# Patient Record
(demographics unavailable — no encounter records)

---

## 2024-11-11 NOTE — REVIEW OF SYSTEMS
[Shortness Of Breath] : shortness of breath [Fever] : no fever [Chills] : no chills [Vision Problems] : no vision problems [Chest Pain] : no chest pain [Palpitations] : no palpitations [Lower Ext Edema] : no lower extremity edema [Abdominal Pain] : no abdominal pain [Nausea] : no nausea [Vomiting] : no vomiting [FreeTextEntry6] : 2/2 asthma

## 2024-11-11 NOTE — PHYSICAL EXAM
[No Acute Distress] : no acute distress [Well Nourished] : well nourished [Well Developed] : well developed [Well-Appearing] : well-appearing [Normal Sclera/Conjunctiva] : normal sclera/conjunctiva [PERRL] : pupils equal round and reactive to light [Normal Outer Ear/Nose] : the outer ears and nose were normal in appearance [No Respiratory Distress] : no respiratory distress  [No Accessory Muscle Use] : no accessory muscle use [Clear to Auscultation] : lungs were clear to auscultation bilaterally [Normal Rate] : normal rate  [Regular Rhythm] : with a regular rhythm [Normal S1, S2] : normal S1 and S2 [No Edema] : there was no peripheral edema [Soft] : abdomen soft [Non-distended] : non-distended [Non Tender] : non-tender [de-identified] : Clear TM in L ear. White speckles present in R TM. B/l ear canals clear although w/ mild erythema likely from frequent cleaning w/ qtips.

## 2024-11-11 NOTE — HEALTH RISK ASSESSMENT
[0] : 2) Feeling down, depressed, or hopeless: Not at all (0) [PHQ-9 Negative - No further assessment needed] : PHQ-9 Negative - No further assessment needed [Never] : Never [[id number]] : 0

## 2024-11-11 NOTE — HEALTH RISK ASSESSMENT
[0] : 2) Feeling down, depressed, or hopeless: Not at all (0) [PHQ-9 Negative - No further assessment needed] : PHQ-9 Negative - No further assessment needed [Never] : Never [[id number]] : 0

## 2024-11-11 NOTE — PLAN
[FreeTextEntry1] : Dc'ed metoprolol succinate and started on labetalol 200mg bid for resistant HTN. F/u in 2 wks for adjustment as needed.  Case discussed w/ Dr. Feng

## 2024-11-11 NOTE — HISTORY OF PRESENT ILLNESS
[FreeTextEntry1] : Pt states he is here for a follow up.  [de-identified] : 64M w/ PMH of HTN, asthma, BPH, GERD, presenting for follow up today of otitis externa and HTN.  Pt has received Ciprofloxacin 0.3% and dexamethasone 0.1% otic suspension x 7 days on 10/3/24 and had relief after using it; however, itching is still present today and would like to know if he could get another refill of it if possible.   In terms of his resistant blood pressure, pt has not seen a cardiologist as referred as he did not have time to make an appointment due to his wife's appointments and due to the office only being open for 2 days for making appointments. Pt currently taking Olmesartan-Amlodipine-HCTZ, 40-10-25mg qd, Metoprolol Succinate 100mg qd, and furosemide 40mg qd. His BP is elevated today as he did not take his medications this morning and was rushing to the appointment. When pt measures BP at home, normally at 190-210s/120-140s. Denies HA, dizziness, vision changes, N/V.

## 2024-11-11 NOTE — HISTORY OF PRESENT ILLNESS
[FreeTextEntry1] : Pt states he is here for a follow up.  [de-identified] : 64M w/ PMH of HTN, asthma, BPH, GERD, presenting for follow up today of otitis externa and HTN.  Pt has received Ciprofloxacin 0.3% and dexamethasone 0.1% otic suspension x 7 days on 10/3/24 and had relief after using it; however, itching is still present today and would like to know if he could get another refill of it if possible.   In terms of his resistant blood pressure, pt has not seen a cardiologist as referred as he did not have time to make an appointment due to his wife's appointments and due to the office only being open for 2 days for making appointments. Pt currently taking Olmesartan-Amlodipine-HCTZ, 40-10-25mg qd, Metoprolol Succinate 100mg qd, and furosemide 40mg qd. His BP is elevated today as he did not take his medications this morning and was rushing to the appointment. When pt measures BP at home, normally at 190-210s/120-140s. Denies HA, dizziness, vision changes, N/V.

## 2024-11-11 NOTE — PHYSICAL EXAM
[No Acute Distress] : no acute distress [Well Nourished] : well nourished [Well Developed] : well developed [Well-Appearing] : well-appearing [Normal Sclera/Conjunctiva] : normal sclera/conjunctiva [PERRL] : pupils equal round and reactive to light [Normal Outer Ear/Nose] : the outer ears and nose were normal in appearance [No Respiratory Distress] : no respiratory distress  [No Accessory Muscle Use] : no accessory muscle use [Clear to Auscultation] : lungs were clear to auscultation bilaterally [Normal Rate] : normal rate  [Regular Rhythm] : with a regular rhythm [Normal S1, S2] : normal S1 and S2 [No Edema] : there was no peripheral edema [Soft] : abdomen soft [Non Tender] : non-tender [Non-distended] : non-distended [de-identified] : Clear TM in L ear. White speckles present in R TM. B/l ear canals clear although w/ mild erythema likely from frequent cleaning w/ qtips.

## 2024-11-25 NOTE — HISTORY OF PRESENT ILLNESS
[Home] : at home, [unfilled] , at the time of the visit. [Medical Office: ([facility])___] : at the medical office located in  [Verbal consent obtained from patient] : the patient, [unfilled] [FreeTextEntry1] : htn urgency [de-identified] : 64M PMHx resistant HTN, asthma, BPH, and GERD, presenting for follow up of HTN. He reports regularly measuring BP at home, with systolic BPs frequently over 200 (today measured twice, sbp 210, reports regular medication adherence). Denies headache, nausea, vomiting, dizziness, blurry vision, or worsening fatigue, or any other acute sx or complaints at this time.

## 2024-11-25 NOTE — HISTORY OF PRESENT ILLNESS
[Home] : at home, [unfilled] , at the time of the visit. [Medical Office: ([facility])___] : at the medical office located in  [Verbal consent obtained from patient] : the patient, [unfilled] [FreeTextEntry1] : htn urgency [de-identified] : 64M PMHx resistant HTN, asthma, BPH, and GERD, presenting for follow up of HTN. He reports regularly measuring BP at home, with systolic BPs frequently over 200 (today measured twice, sbp 210, reports regular medication adherence). Denies headache, nausea, vomiting, dizziness, blurry vision, or worsening fatigue, or any other acute sx or complaints at this time.

## 2024-11-25 NOTE — END OF VISIT
[] : Resident [FreeTextEntry3] : TTM visit  #HTN urgency- was seen few weeks ago. bp still very high . needs to come to office this week to evaluate. needs to treat PRICILLA. labetalol added few weeks ago.  can consider adding aldactone for resistant htn  #elevated PSA_ needs to reassess later [Time Spent: ___ minutes] : I have spent [unfilled] minutes of time on the encounter which excludes teaching and separately reported services.

## 2024-11-25 NOTE — PLAN
[FreeTextEntry1] : 64M PMHx resistant HTN, asthma, BPH, and GERD, presenting for follow up of HTN.  #resistant hypertension pt endorses taking amlodipine-valsartan-hctz combination pill and losartan (recently added to current regimen), nonetheless w persistently elevated bp. unclear whether cuff is correct size (large) - follow up in person this week, pt in agreement - rx for 10d spironolactone sent to preferred pharmacy, will continue pending follow up           - risk for hyperkalemia given other medications, check at follow up, will require regular monitoring - collect labs at upcoming appt, ensure home cuff size correct  #PRICILLA moderate pricilla per recent sleep study, likely contributing to resistant htn - follow up referral to sleep medicine, pt would benefit from use of cpap ovn

## 2025-01-17 NOTE — REVIEW OF SYSTEMS
[Back Pain] : back pain [Fever] : no fever [Chills] : no chills [Hearing Loss] : no hearing loss [Nasal Discharge] : no nasal discharge [Sore Throat] : no sore throat [Chest Pain] : no chest pain [Palpitations] : no palpitations [Lower Ext Edema] : no lower extremity edema [Shortness Of Breath] : no shortness of breath [Wheezing] : no wheezing [Cough] : no cough [Abdominal Pain] : no abdominal pain [Nausea] : no nausea [Constipation] : no constipation [Vomiting] : no vomiting [Dysuria] : no dysuria [Hematuria] : no hematuria [Joint Pain] : no joint pain [Headache] : no headache [Dizziness] : no dizziness [Fainting] : no fainting [FreeTextEntry4] : bilateral ear itching

## 2025-01-17 NOTE — HEALTH RISK ASSESSMENT
[Little interest or pleasure doing things] : 1) Little interest or pleasure doing things [Feeling down, depressed, or hopeless] : 2) Feeling down, depressed, or hopeless [0] : 2) Feeling down, depressed, or hopeless: Not at all (0) [PHQ-9 Negative - No further assessment needed] : PHQ-9 Negative - No further assessment needed [Never] : Never [[id number]] : 0

## 2025-01-17 NOTE — END OF VISIT
[] : Resident [FreeTextEntry3] : 64M w/uncontrolled HTN here for medication refills.  BP elevated today, asymptomatic. Has not taken any of his medications while he was taking care of his mother for several days.  Refill meds, f/u Monday for BP check.  Has f/u with Dr. Marisol Landrum

## 2025-01-17 NOTE — PHYSICAL EXAM
[No Acute Distress] : no acute distress [Well Nourished] : well nourished [Well Developed] : well developed [Normal Sclera/Conjunctiva] : normal sclera/conjunctiva [PERRL] : pupils equal round and reactive to light [Normal Outer Ear/Nose] : the outer ears and nose were normal in appearance [No JVD] : no jugular venous distention [No Lymphadenopathy] : no lymphadenopathy [No Respiratory Distress] : no respiratory distress  [No Accessory Muscle Use] : no accessory muscle use [Clear to Auscultation] : lungs were clear to auscultation bilaterally [Normal Rate] : normal rate  [Regular Rhythm] : with a regular rhythm [Normal S1, S2] : normal S1 and S2 [Soft] : abdomen soft [Non Tender] : non-tender [Non-distended] : non-distended [Normal Bowel Sounds] : normal bowel sounds [No CVA Tenderness] : no CVA  tenderness [No Spinal Tenderness] : no spinal tenderness [Coordination Grossly Intact] : coordination grossly intact [No Focal Deficits] : no focal deficits [Normal Affect] : the affect was normal [Normal Insight/Judgement] : insight and judgment were intact [de-identified] : Bilateral hazy TMs (R>L)

## 2025-01-17 NOTE — ASSESSMENT
[FreeTextEntry1] : 64-year-old male with PMHx resistant HTN, asthma, BPH, and GERD presented to the clinic for HTN follow-up. Patient reports he has not been taking his anti-hypertensives for the past 3 days due to unexpectedly needing to take care of his ill mother. Refilled labetolol 200mg BID & omelsartan-amlodipind-HCTZ 40-10-25mg QD. Patient has follow-up appointment with cardiologist in February 2025. RTC in 1 week to recheck BP.

## 2025-01-17 NOTE — HISTORY OF PRESENT ILLNESS
[FreeTextEntry1] : pt here to recheck bp [de-identified] : David is a 64-year-old male with PMHx resistant HTN, asthma, BPH, and GERD presented to the clinic for HTN follow-up. Patient reports he has not been taking his anti-hypertensives for the past 3 days due to unexpectedly needing to take care of his ill mother. He regularly measures his blood pressure at home and states it is in the 200s/100s, will improve to the 190s/90s at best with medications. He also reports bilateral ear itching despite two courses of oral antibiotics and ear drops. Denies headache, changes to vision, lightheadedness, chest pain, shortness of breath, nausea, vomiting or abdominal pain. Denies changes to urinary or bowel habits.

## 2025-03-07 NOTE — HISTORY OF PRESENT ILLNESS
[Never] : never [TextBox_4] : My patient since 2019 w lifelong asthma w frequent exacerbations, never smoker, trouble adhering to inhaler regimen. Sinus/nasal disease and allergies, reflux. Prefers Breo, didn't find Trelegy better when tried previously. Sees ENT and GI here.  8/15/2024: Was changed to generic Advair for insurance reasons. Prefer Breo. In general takes it once a day, but not today. Taking albuterol many times a day. 4-5x a day with relief. He remains on carvedilol. His stepfather . Missed follow up w Dr Santana for this reason. Sees allergist, Dr Mendoza Bourgeois, has allergies including cat, does not have a cat but his kids do and he reacts to cat hair on their clothing when they visit.   3/7/2025: Was taken off carvedilol, now on metoprolol. He does think his breathing was better once she stopped carvedilol. He remains on Breo once a day, including today. Still uses albuterol multiple times a day. Has not seen his allergist. We had planned to start Dupixent and did paperwork but he did not follow up. His stated reason for this was that his stepfather  but of course this had already happened prior to making this plan to start Dupixent. He reports also caring for wife and mother who have illness. He states he has not really thought about Dupixent. Primary care sent him for sleep study in the interim.

## 2025-03-07 NOTE — ASSESSMENT
[FreeTextEntry1] : Data reviewed:  PSG 10/2024 [facility]: mod PRICILLA, AHI 25/18, ellen 78%, 19 min <89%  Labs 8/2019: eos 500/ul, IgE 94, RAST cat, neg Aspergillus Abs Eos 500/ul again in 2020 and most recently 370/ul (5/18/2024)  CTA chest [facility] 5/10/2019 : mild apical scarring but otherwise unremarkable lung parenchyma CXR [facility] 5/2024: clear lungs Cardiac CT [facility] 5/2024 : no lung findings  [place] 08/08/2019 : restricted, FEV1 74% / FENO 19 [place] 2/13/20: mild obstruction, best FEV1 81% [place] 3/3/2023: restricted, FEV1 64% / FENO 7 P[place] 4/11/2024: FVC 2.82L (79%), FEV1 1.99L (74%), no BDR, TLC 4.22L (73%), DLCO 18.23 (73%) [place] 8/15/2024: restricted, FEV1 67% / FENO 11  Impression: Asthma by history  - Exacerbation 1/2024 Sinus disease Reflux Moderate PRICILLA - declines CPAP today (3/7/2025) due to prior use with no perceived benefit  Plan: Clinically he has asthma. He can stay on Breo daily and it is a good change that he is off carvedilol. We had planned to start Dupixent but the patient did not follow up on his end. We will table this until/unless he wants to pursue it. He has moderate PRICILLA. He's used CPAP before and didn't perceive benefit from treatment in terms of sleep quality. Discussed why this might be and how we typically deal w this. Discussed risks of untreated PRICILLA. He declines tx. He can let me know if he changes his mind. Will plan on routine follow up in 6 mos.

## 2025-03-20 NOTE — END OF VISIT
[] : Resident [FreeTextEntry3] : Patient is here for follow-up of hypertension and also for a rash.  Patient is on multiple medications for blood pressure and who has seen a cardiologist recently who advised titrating up his beta-blockade if his blood pressure is not controlled.  His blood pressure is not controlled today so we will increase his metoprolol succinate to 200 mg daily. He is also here for a preop. His BP must be <160 systolic prior to surgery. F/U soon Patient has a history of atopy and the rash appears eczematous.  Will prescribe hydrocortisone cream.  If no improvement will refer to dermatology.

## 2025-03-20 NOTE — PHYSICAL EXAM
[No Acute Distress] : no acute distress [Well Nourished] : well nourished [Well Developed] : well developed [Normal Sclera/Conjunctiva] : normal sclera/conjunctiva [PERRL] : pupils equal round and reactive to light [No JVD] : no jugular venous distention [Supple] : supple [No Respiratory Distress] : no respiratory distress  [No Accessory Muscle Use] : no accessory muscle use [Clear to Auscultation] : lungs were clear to auscultation bilaterally [Normal Rate] : normal rate  [Regular Rhythm] : with a regular rhythm [Normal S1, S2] : normal S1 and S2 [Soft] : abdomen soft [Non Tender] : non-tender [Normal Bowel Sounds] : normal bowel sounds [No CVA Tenderness] : no CVA  tenderness [No Spinal Tenderness] : no spinal tenderness [de-identified] : dry & scaly plaque left upper arm with skin discoloration & excoriations, small patch of skin discoloration to right lower neck

## 2025-03-20 NOTE — ASSESSMENT
[FreeTextEntry1] : 64-year-old male with PMHx resistant HTN, asthma, BPH, and GERD presented to the clinic for HTN follow-up. Patient also reports a pruritic rash that began 1 month ago behind his ear and is now more diffuse. Increased metoprolol from 100mg to 200mg as per cardiology note - patient will be seeing Dr. Damon on 3/21/2024. Dermatology referral and hydrocortisone 2% ointment given for atopic dermatitis. Of note, patient has a cystoscopy scheduled for 4/21/2025, EKG & bloodwork done today. RTC in 3 weeks.  Discussed with Dr. Vidal.

## 2025-03-20 NOTE — PHYSICAL EXAM
[de-identified] : N/A - TELEPHONE ENCOUNTER
[de-identified] : N/A - TELEPHONE ENCOUNTER
No deformities present

## 2025-03-20 NOTE — HISTORY OF PRESENT ILLNESS
[FreeTextEntry1] : BP follow-up [de-identified] : David is a 64-year-old male with PMHx resistant HTN, asthma, BPH, and GERD presented to the clinic for HTN follow-up. Patient also reports a pruritic rash that began 1 month ago behind his ear and is now more diffuse - located on his lower neck and upper left arm. He saw Dr. Damon (cardiologist) on 2/24/2025 who stopped labetalol and started metoprolol 100mg in addition to amlodipine-olmesartan-HCTZ at 10-40-25 mg daily and continue furosemide 40 mg daily. Patient states he has been taking medications as prescribed and reports home BPs remain 180s+/90s+. Denies fever/chills, shortness of breath, chest pain, palpitations, nausea, vomiting or abdominal pain. Denies changes in urinary or bowel habits.

## 2025-03-20 NOTE — HISTORY OF PRESENT ILLNESS
[FreeTextEntry1] : BP follow-up [de-identified] : David is a 64-year-old male with PMHx resistant HTN, asthma, BPH, and GERD presented to the clinic for HTN follow-up. Patient also reports a pruritic rash that began 1 month ago behind his ear and is now more diffuse - located on his lower neck and upper left arm. He saw Dr. Damon (cardiologist) on 2/24/2025 who stopped labetalol and started metoprolol 100mg in addition to amlodipine-olmesartan-HCTZ at 10-40-25 mg daily and continue furosemide 40 mg daily. Patient states he has been taking medications as prescribed and reports home BPs remain 180s+/90s+. Denies fever/chills, shortness of breath, chest pain, palpitations, nausea, vomiting or abdominal pain. Denies changes in urinary or bowel habits.

## 2025-03-20 NOTE — REVIEW OF SYSTEMS
[Itching] : itching [Skin Rash] : skin rash [Fever] : no fever [Chills] : no chills [Chest Pain] : no chest pain [Palpitations] : no palpitations [Shortness Of Breath] : no shortness of breath [Cough] : no cough [Abdominal Pain] : no abdominal pain [Nausea] : no nausea [Diarrhea] : no diarrhea [Vomiting] : no vomiting [Dysuria] : no dysuria [Back Pain] : no back pain

## 2025-03-20 NOTE — PHYSICAL EXAM
[No Acute Distress] : no acute distress [Well Nourished] : well nourished [Well Developed] : well developed [Normal Sclera/Conjunctiva] : normal sclera/conjunctiva [PERRL] : pupils equal round and reactive to light [No JVD] : no jugular venous distention [Supple] : supple [No Respiratory Distress] : no respiratory distress  [No Accessory Muscle Use] : no accessory muscle use [Clear to Auscultation] : lungs were clear to auscultation bilaterally [Normal Rate] : normal rate  [Regular Rhythm] : with a regular rhythm [Normal S1, S2] : normal S1 and S2 [Soft] : abdomen soft [Non Tender] : non-tender [Normal Bowel Sounds] : normal bowel sounds [No CVA Tenderness] : no CVA  tenderness [No Spinal Tenderness] : no spinal tenderness [de-identified] : dry & scaly plaque left upper arm with skin discoloration & excoriations, small patch of skin discoloration to right lower neck

## 2025-04-18 NOTE — ASSESSMENT
[FreeTextEntry1] : 65M with PMHx resistant HTN c/b hypertensive retinopathy, asthma, BPH, and GERD presented to the clinic for HTN follow-up.   Precepted with Dr. Causey.

## 2025-04-18 NOTE — REVIEW OF SYSTEMS
[Itching] : itching [Negative] : Psychiatric [FreeTextEntry6] : intermittent GARCIA [de-identified] : pruritic rash R face

## 2025-04-18 NOTE — HISTORY OF PRESENT ILLNESS
[FreeTextEntry1] : pt is here for follow up. [de-identified] : 65M with PMHx resistant HTN c/b hypertensive retinopathy, asthma, BPH, and GERD presented to the clinic for HTN follow-up. Pt recently increased metoprolol succinate from 100mg qd to 200mg and has been feeling that chest is tighter, although tolerable. Takes BPs at home and states they are regularly 180-200s systolic over 100 diastolic. Currently asymptomatic with no complaints - denies HA, blurry vision, chest pain, palpitations, dizziness, lightheadedness. When asked, pt states he has had renal US in the past but that it has been a long time. Does not see a nephrologist. Pt had pre-op for cystoscopy next week but is unable to undergo procedure because of continued hypertension. Still c/o pruritic rash R side of face, states that hydrocortisone cream did not work.

## 2025-04-18 NOTE — END OF VISIT
[] : Resident [FreeTextEntry3] : 65M w/uncontrolled HTN, asthma, atopic dermatitis here for f/u. Blood pressures in clinic, at home, and at other providers office are always elevated above goal.  Is on max dose of ARB, CCB, diuretic, bb and started on Lasix 40 by Dr. Damon. Will check ACR, renal US to r/o ALTAGRACIA (pt does report neg prior renal US). Tasked DR. Damon to discuss and see if reasonable to start hydralazine?  Short term f/u next week.

## 2025-04-18 NOTE — HEALTH RISK ASSESSMENT
[0] : 2) Feeling down, depressed, or hopeless: Not at all (0) [Never] : Never [PHQ-2 Negative - No further assessment needed] : PHQ-2 Negative - No further assessment needed [Time Spent: ___ Minutes] : I spent [unfilled] minutes performing a depression screening for this patient. [[id number]] : 0

## 2025-04-18 NOTE — PHYSICAL EXAM
[Normal] : affect was normal and insight and judgment were intact [de-identified] : large neck size [de-identified] : erythematous macules R side of face with larger macules towards forehead

## 2025-05-27 NOTE — ASSESSMENT
[FreeTextEntry1] : #Uncontrolled hypertension -> Patient blood pressure has been uncontrolled for a long time.  In terms with his renal function is grossly normal but he does have hypertensive retinopathy.  His serum potassium and total serum bicarbonate are normal.  He has had multiple CT angio done in the past that did not show renal artery stenosis.  I will screen for secondary hypertensive disorders.  I will get renal duplex, obtain renin aldosterone ratio, check plasma metanephrine levels and random cortisol. -> Patient just had his regimen of BP changed this week therefore I will make no further changes at this time. -> I will get a 24-hour a.m. BP on him as well. -> My plan would be to maximize MRA and if no significant improvement in blood pressure then consider either clonidine or hydralazine.  Follow-up in 4 weeks

## 2025-05-27 NOTE — REVIEW OF SYSTEMS
[Fever] : no fever [Chills] : no chills [Shortness Of Breath] : no shortness of breath [Wheezing] : no wheezing [Cough] : no cough [SOB on Exertion] : no shortness of breath during exertion [Abdominal Pain] : no abdominal pain [Vomiting] : no vomiting [Constipation] : no constipation [Diarrhea] : no diarrhea [Dysuria] : no dysuria [Incontinence] : no incontinence [Hesitancy] : no urinary hesitancy [Nocturia] : no nocturia [Joint Swelling] : no joint swelling [Joint Stiffness] : no joint stiffness

## 2025-05-27 NOTE — HISTORY OF PRESENT ILLNESS
[FreeTextEntry1] : NPV-Rash  [de-identified] :  y/o M/F presents for Rash -located on neck, face, chest and arms onset few months -has tried benadryl but did not help -very itchy not painful

## 2025-05-27 NOTE — HISTORY OF PRESENT ILLNESS
[FreeTextEntry1] : Reason for visit: Hypertension  65-year-old male with uncontrolled hypertension here today for establishing care.  Patient has been dealing with hypertension all his life.  He states that he was diagnosed with hypertension at a very young age and [place].  He was initially not on any medications.  He states that his blood pressure is always in systolic of 200s.  He is being currently seen by cardiology who recently changed his blood pressure medications to amlodipine-olmesartan, metoprolol 200, Lasix 40 and Aldactone 25 mg daily.  His blood pressure in office is as high as 214/90.  Patient is asymptomatic today.  He states that when his blood pressure drops down he feels dizzy.  He has had multiple CT angios in the past that has not shown any evidence of renal artery stenosis.  He had an aldosterone level which was about 13 but no renin level was done along with it.  He does not have any hyperkalemia or elevated total bicarb on his labs.  At this time his renal function is grossly normal.  He has signs of hypertensive retinopathy.  He is also struggling with chronic back pain for which he is seeing pain management.  He does take ibuprofen regularly but limit himself to 2 pills/day or so.  He is also experiencing gross hematuria with blood clots for which he is seeing urology.  He has not had cystoscopy because of his uncontrolled blood pressure.   Medical history: PUD/SACHA, asthma, obesity, TIA, uric acid kidney stones, BPH status post TURP. Medications: Amlodipine-olmesartan 10/40 mg daily, Toprol 200 mg daily, furosemide 40 mg daily, Aldactone 25 mg daily, aspirin 81 mg daily, atorvastatin 80 mg daily.

## 2025-05-27 NOTE — ASSESSMENT
[FreeTextEntry1] : #Urticaria - very dermatographic and today with urticarial wheals  - no response to betamethasone - going on for months per patient - trial Zyrtec 10mg BID x 2 weeks then QHS - RTC 6 weeks, if no change consider bx to r/o urticarial phase BP but given no response to topical steroids this seems less likely at this time - referral to allergy for allergy testing    A. 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

## 2025-06-10 NOTE — PROCEDURE
[FreeTextEntry1] : uncontrolled HTN Placed large ABPM cuff on left upper arm. Gave instructions for device function and proper fit. approx sleep period: 12/1a - 8-9a (varies) current BP meds: amlodipine-olmesartan 10-40mg, metoprolol succinate 200mg, furosemide 40mg, spironolactone 25mg daily

## 2025-07-08 NOTE — ASSESSMENT
[FreeTextEntry1] :  #[facility] D[facility] - today light pinkness and scale edges of face and ears - Rx KTZ 2% sh as wash QD - RX elocon cream QD PRN flare   RTC 3 weeks

## 2025-07-08 NOTE — HISTORY OF PRESENT ILLNESS
[FreeTextEntry1] : rpa- f/u rash  [de-identified] :  David Baptistez 64 y/o M presents for f/u on rash, says he self d/c cetirizine due to getting extreme back pain, and kidney pain now resolved. rash is the same, still itchy